# Patient Record
Sex: FEMALE | Race: WHITE | NOT HISPANIC OR LATINO | Employment: OTHER | URBAN - METROPOLITAN AREA
[De-identification: names, ages, dates, MRNs, and addresses within clinical notes are randomized per-mention and may not be internally consistent; named-entity substitution may affect disease eponyms.]

---

## 2021-09-20 ENCOUNTER — TRANSCRIBE ORDERS (OUTPATIENT)
Dept: SPEECH THERAPY | Facility: HOSPITAL | Age: 66
End: 2021-09-20

## 2021-09-20 DIAGNOSIS — R41.89 COGNITIVE DEFICITS: Primary | ICD-10-CM

## 2021-09-22 ENCOUNTER — HOSPITAL ENCOUNTER (OUTPATIENT)
Dept: SPEECH THERAPY | Facility: HOSPITAL | Age: 66
Setting detail: THERAPIES SERIES
Discharge: HOME | End: 2021-09-22

## 2021-09-22 DIAGNOSIS — R41.841 COGNITIVE COMMUNICATION DEFICIT: Primary | ICD-10-CM

## 2021-09-22 DIAGNOSIS — F02.80 ALZHEIMER DISEASE (HCC): ICD-10-CM

## 2021-09-22 DIAGNOSIS — G30.9 ALZHEIMER DISEASE (HCC): ICD-10-CM

## 2021-09-22 PROCEDURE — 96125 COGNITIVE TEST BY HC PRO: CPT | Performed by: SPEECH-LANGUAGE PATHOLOGIST

## 2021-09-22 NOTE — THERAPY EVALUATION
Outpatient Speech Language Pathology   Adult Speech Language Cognitive Initial Evaluation  [facility]     Patient Name: Tashia Felton  : 1955  MRN: [id number]  Today's Date: 2021        Visit Date: 2021   There is no problem list on file for this patient.       History reviewed. No pertinent past medical history.     History reviewed. No pertinent surgical history.      Visit Dx:    ICD-10-CM ICD-9-CM   1. Cognitive communication deficit  R41.841 799.52   2. Alzheimer disease (HCC)  G30.9 331.0    F02.80         Patient was wearing a face mask during this therapy encounter. Therapist used appropriate personal protective equipment including mask, eye protection and gloves.  Mask used was standard procedure mask. Appropriate PPE was worn during the entire therapy session. Hand hygiene was completed before and after therapy session. Patient is not in enhanced droplet precautions.               OP SLP Assessment/Plan - 21 1429        SLP Assessment    Functional Problems  Speech Language- Adult/Cognition   -KA    Impact on Function: Adult Speech Language/Cognition  Trouble learning or remembering new information;Restrictions in personal and social life   -KA    Please refer to paper survey for additional self-reported information  Yes   -KA    Please refer to items scanned into chart for additional diagnostic informaiton and handouts as provided by clinician  Yes   -KA    Prognosis  Good (comment)   -KA    Patient/caregiver participated in establishment of treatment plan and goals  Yes   -KA    Patient would benefit from skilled therapy intervention  Yes   -KA       SLP Plan    Frequency  1x a week   -KA    Duration  8 weeks   -KA    Planned CPT's?  SLP DEV COG SKILLS INITIAL (15 MIN) : 46742;SLP DEV COG SKILLS ADD (15 MIN) : 32941   -KA    Expected Duration of Therapy Session (SLP Eval)  45   -KA      User Key  (r) = Recorded By, (t) = Taken By, (c) = Cosigned By    Initials Name Provider  Type    KA Byron Ho MA,CCC-SLP Speech and Language Pathologist          SLP SLC Evaluation - 09/22/21 1200        Communication Assessment/Intervention    Document Type  evaluation   -KA    Subjective Information  no complaints   -KA    Patient Observations  alert;cooperative   -KA    Care Plan Review  evaluation/treatment results reviewed   -KA    Patient Effort  good   -KA    Symptoms Noted During/After Treatment  none   -KA       General Information    Patient Profile Reviewed  yes   -KA    Pertinent History Of Current Problem  Patient referred today for cognitive impairment. Medical records requested from referring provider GASPER Cortes. Per records patient was diagnosed with Alzheimers disease in 2017. Patient reports difficulty with short term memory. Patient reports she is not well organized, and losses lists and has trouble keeping up with appoinments. Patient reports she does drive and continues to manage ADLs. Patient lives with her ex  who does provide support. Patient reported she recently retired this year as  at [employer] and has degree in accounting. Per medical records and patient she was diagnosed with early onset Alzheimers disease in 2017 at [facility] by Dr Garcia. Patient's goals are to implement strategies and cognitive stimulating tasks.      -KA    Precautions/Limitations, Vision  WFL   -KA    Precautions/Limitations, Hearing  WFL   -KA    Patient Level of Education  Degree in accounting    -KA    Prior Level of Function-Communication  WFL;other (see comments)    prior to cognitive decline in 2017  -KA    Plans/Goals Discussed with  patient   -KA    Barriers to Rehab  none identified   -KA    Patient's Goals for Discharge  functional cognition;other (see comments)    use of strategies   -KA    Standardized Assessment Used  CLQT   -KA       Standardized Tests    Cognitive/Memory Tests  CLQT: Cognitive Linguistic Quick Test   -       CLQT (The Cognitive  "Linguistic Quick Test)    Attention Domain Score  198   -KA    Attention Severity Rating  4: WNL   -KA    Memory Domain Score  171   -KA    Memory Severity Rating  4: WNL   -KA    Executive Function Domain Score  34   -KA    Executive Function Severity Rating  4: WNL   -KA    Language Domain Score  33   -KA    Language Severity Rating  4: WNL   -KA    Visuospatial Domain Score  99   -KA    Visuospatial Severity Rating  4: WNL   -KA    Clock Drawing Total Score  12   -KA    Clock Drawing Severity Rating  WNL   -KA    Composite Severity Rating  4.0   -KA    Composite Severity Rating Range  4.0 - 3.5: WNL   -KA    CLQT Comments  Overall patient demonstrated WNL in each domain and did not score below the criterion cut of scores in any subtest. Due to patient's dx of Alzheimers disease therapy will focus on cognitive stimulating tasks and education on internal/external memory aids. Patient reported today was a \"good day,\" and reported low stress today. Patient reports she priortizes exercises, healthy diet and adequate sleep which she reports helps her. Ongoing diagnostic assessment will be completed on patient's cognitive stimulating tasks.   -KA       SLP Clinical Impressions    SLP Diagnosis  Mild cognitive impairment, Alzheimers disease    -KA    Rehab Potential/Prognosis  excellent   -    SLC Criteria for Skilled Therapy Interventions Met  yes   -KA    Functional Impact  difficulty completing home management task;difficulty completing vocational tasks   -KA       Recommendations    Therapy Frequency (SLP SLC)  1 day per week   -KA    Predicted Duration Therapy Intervention (Days)  until discharge   -KA    Anticipated Discharge Disposition (SLP)  home with assist   -KA      User Key  (r) = Recorded By, (t) = Taken By, (c) = Cosigned By    Initials Name Provider Type    Byron Gomez MA,CCC-SLP Speech and Language Pathologist                         OP SLP Education     Row Name 09/22/21 6660       Education    " Barriers to Learning  No barriers identified  -KA    Education Provided  Described results of evaluation;Patient expressed understanding of evaluation  -KA    Assessed  Learning needs;Learning motivation  -KA    Learning Motivation  Strong  -KA    Learning Method  Explanation  -KA    Teaching Response  Verbalized understanding  -KA      User Key  (r) = Recorded By, (t) = Taken By, (c) = Cosigned By    Initials Name Effective Dates    KA Byron Ho MA,[facility]-SLP 06/16/21 -         SLP OP Goals     Row Name 09/22/21 1400          Goal Type Needed    Goal Type Needed  Memory  -KA        Subjective Pain    Able to rate subjective pain?  yes  -KA     Pre-Treatment Pain Level  0  -KA     Post-Treatment Pain Level  0  -KA        Memory Goals    Memory LTG's  Patient will be able to remember  information needed to participate in activities of daily living;Patient and family will implement compensatory strategies to maximize patient’s Memory function so patient can continue to participate in daily activities  -KA     Patient and family will implement compensatory strategies to maximize patient’s Memory function so patient can continue to participate in daily activities  90%:;without cues  -KA     Status: Patient and family will implement compensatory strategies to maximize patient’s Memory function so patient can continue to participate in daily activities  New  -KA     Status: Patient will be able to remember  information needed to participate in activities of daily living  New  -KA     Memory STG's  Patient’s memory skills will be enhanced as reported by patient by utilizing internal memory strategies to recall up to 3 pieces of information after a 5- minute delay;Patient’s memory skills will be enhanced as reported by patient by using external memory aides  -KA     Patient’s memory skills will be enhanced as reported by patient by utilizing internal memory strategies to recall up to 3 pieces of information after a 5-  minute delay  90%:;without cues  -KA     Status: Patient’s memory skills will be enhanced as reported by patient by utilizing internal memory strategies to recall up to 3 pieces of information after a 5- minute delay  New  -KA     Patient’s memory skills will be enhanced as reported by patient by using external memory aides  90%:;without cues  -KA     Status: Patient’s memory skills will be enhanced as reported by patient by using external memory aides  New  -KA       User Key  (r) = Recorded By, (t) = Taken By, (c) = Cosigned By    Initials Name Provider Type    Byron Gomez MA,CCC-SLP Speech and Language Pathologist           SLP Outcome Measures (last 72 hours)      SLP Outcome Measures     Row Name 21 1400             SLP Outcome Measures    Outcome Measure Used?  Adult NOMS  -KA         Adult FCM Scores    FCM Chosen  Memory  -KA      Memory FCM Score  6  -KA        User Key  (r) = Recorded By, (t) = Taken By, (c) = Cosigned By    Initials Name Effective Dates    Byron Gomez MA,CCC-SLP 21 -         12:00 to 12:30, 8:15 to 8:30, 14:00 to 14:30     Time Calculation:   SLP Start Time: 1015  SLP Stop Time: 1100  SLP Time Calculation (min): 45 min  Timed Charges  96125-Standardized cognitive performance testin  Total Minutes  Timed Charges Total Minutes: 120   Total Minutes: 120    Therapy Charges for Today     Code Description Service Date Service Provider Modifiers Qty    03530831200  ST STD COG PERF TEST PER HOUR 2021 Byron Ho MA,CCC-SLP GN 2                   Byron oH MA,ARA-SLP  2021

## 2021-09-29 ENCOUNTER — HOSPITAL ENCOUNTER (OUTPATIENT)
Dept: SPEECH THERAPY | Facility: HOSPITAL | Age: 66
Setting detail: THERAPIES SERIES
Discharge: HOME | End: 2021-09-29

## 2021-09-29 DIAGNOSIS — R41.841 COGNITIVE COMMUNICATION DEFICIT: Primary | ICD-10-CM

## 2021-09-29 DIAGNOSIS — G30.9 ALZHEIMER DISEASE (HCC): ICD-10-CM

## 2021-09-29 DIAGNOSIS — F02.80 ALZHEIMER DISEASE (HCC): ICD-10-CM

## 2021-09-29 PROCEDURE — 97129 THER IVNTJ 1ST 15 MIN: CPT | Performed by: SPEECH-LANGUAGE PATHOLOGIST

## 2021-09-29 PROCEDURE — 97130 THER IVNTJ EA ADDL 15 MIN: CPT | Performed by: SPEECH-LANGUAGE PATHOLOGIST

## 2021-09-29 NOTE — THERAPY TREATMENT NOTE
Outpatient Speech Language Pathology   Adult Speech Language Cognitive Treatment Note  [facility]     Patient Name: Tashia Felton  : 1955  MRN: [id number]  Today's Date: 2021         Visit Date: 2021   There is no problem list on file for this patient.         Visit Dx:    ICD-10-CM ICD-9-CM   1. Cognitive communication deficit  R41.841 799.52   2. Alzheimer disease (HCC)  G30.9 331.0    F02.80          Patient was wearing a face mask during this therapy encounter. Therapist used appropriate personal protective equipment including mask, eye protection and gloves.  Mask used was standard procedure mask. Appropriate PPE was worn during the entire therapy session. Hand hygiene was completed before and after therapy session. Patient is not in enhanced droplet precautions.                               SLP OP Goals     Row Name 21 1100          Subjective Comments    Subjective Comments  Patient is pleasant and cooperative, very receptive to education.   -KA        Subjective Pain    Able to rate subjective pain?  yes  -KA     Pre-Treatment Pain Level  0  -KA     Post-Treatment Pain Level  0  -KA        Memory Goals    Patient’s memory skills will be enhanced as reported by patient by utilizing internal memory strategies to recall up to 3 pieces of information after a 5- minute delay  90%:;without cues  -KA     Status: Patient’s memory skills will be enhanced as reported by patient by utilizing internal memory strategies to recall up to 3 pieces of information after a 5- minute delay  New  -KA     Comments: Patient’s memory skills will be enhanced as reported by patient by utilizing internal memory strategies to recall up to 3 pieces of information after a 5- minute delay  5 min delay with recalling 3 unrelated words with use of internal memory aids education on (pt used repetition and visualization, other strategies included story telling and associations)=100% accuracy   -KA     Patient’s  memory skills will be enhanced as reported by patient by using external memory aides  90%:;without cues  -KA     Status: Patient’s memory skills will be enhanced as reported by patient by using external memory aides  New  -KA     Comments: Patient’s memory skills will be enhanced as reported by patient by using external memory aides  Extensive education completed today on external memory aids and how patient can apply includng use of notebooks, calendars, writing lists, dry eraser board. Patient homework is to bring a notebook next week and purchase a calendar to hang in home. patient writes down appointments in her small calendar she has in purse, however pt reports she forgets to look at it or misplaces it. Patient felt hanging a larger calendar in her kitchen would be very helpful. SLP also asked patients current partner who she lives with to attend at least one session for education to offer pt support and help pt utilize at home and pt voiced she would ask.   -KA       User Key  (r) = Recorded By, (t) = Taken By, (c) = Cosigned By    Initials Name Provider Type    Byron Gomez MA,CCC-SLP Speech and Language Pathologist                 Time Calculation:   SLP Start Time: 1015  SLP Stop Time: 1100  SLP Time Calculation (min): 45 min  Timed Charges  28477-CH Dev of Cogn Skills Initial Minutes: 15  26998-OC Dev of Cogn Skills Add Minutes: 30  Total Minutes  Timed Charges Total Minutes: 45   Total Minutes: 45    Therapy Charges for Today     Code Description Service Date Service Provider Modifiers Qty    40045694892 HC ST DEV OF COGN SKILLS INITIAL 15 MIN 9/29/2021 Byron Ho MA,CCC-SLP  1    41775482302 HC ST DEV OF COGN SKILLS EACH ADDT'L 15 MIN 9/29/2021 Byron Ho MA,CCC-SLP  2                   Byron Ho MA,CCC-SLP  9/29/2021

## 2021-10-06 ENCOUNTER — HOSPITAL ENCOUNTER (OUTPATIENT)
Dept: SPEECH THERAPY | Facility: HOSPITAL | Age: 66
Setting detail: THERAPIES SERIES
Discharge: HOME | End: 2021-10-06

## 2021-10-06 DIAGNOSIS — G30.9 ALZHEIMER DISEASE (HCC): Primary | ICD-10-CM

## 2021-10-06 DIAGNOSIS — F02.80 ALZHEIMER DISEASE (HCC): Primary | ICD-10-CM

## 2021-10-06 PROCEDURE — 97130 THER IVNTJ EA ADDL 15 MIN: CPT

## 2021-10-06 PROCEDURE — 97129 THER IVNTJ 1ST 15 MIN: CPT

## 2021-10-06 NOTE — THERAPY TREATMENT NOTE
Outpatient Speech Language Pathology   Adult Speech Language Cognitive Treatment Note  [facility]     Patient Name: Tashia Felton  : 1955  MRN: [id number]  Today's Date: 10/6/2021         Visit Date: 10/06/2021   There is no problem list on file for this patient.         Visit Dx:    ICD-10-CM ICD-9-CM   1. Alzheimer disease (HCC)  G30.9 331.0    F02.80        OP SLP Assessment/Plan - 10/06/21 1441        SLP Assessment    Functional Problems  Speech Language- Adult/Cognition   -TH      User Key  (r) = Recorded By, (t) = Taken By, (c) = Cosigned By    Initials Name Provider Type     Vivien Moscoso Speech and Language Pathologist                            SLP OP Goals     Row Name 10/06/21 1400          Memory Goals    Memory LTG's  Patient will be able to remember  information needed to participate in activities of daily living;Patient and family will implement compensatory strategies to maximize patient’s Memory function so patient can continue to participate in daily activities  -TH     Memory STG's  Patient’s memory skills will be enhanced as reported by patient by utilizing internal memory strategies to recall up to 3 pieces of information after a 5- minute delay;Patient’s memory skills will be enhanced as reported by patient by using external memory aides  -TH     Comments: Patient’s memory skills will be enhanced as reported by patient by using external memory aides  Provided education on memory strategies (internal and external) to help patient with memory for daily tasks. Provided patient with handout and reviewed examples. Patient taking notes throughout session. Also reviewed how to use notes/reminders/calendar section on iphone. She verbalizes understanding. Given 5 minute delay patient was able to recall 4/4 things about novel SLP. Began discussing cognitive apps for patients iPad, but unable to complete 2/2 time constraints.    -TH       User Key  (r) = Recorded By, (t) = Taken By, (c) =  Cosigned By    Initials Name Provider Type    TH Vivien Moscoso Speech and Language Pathologist        OP SLP Education     Row Name 10/06/21 1441       Education    Barriers to Learning  No barriers identified  -TH    Education Provided  -- memory strategies  -TH    Assessed  Learning motivation  -TH    Learning Motivation  Strong  -TH    Learning Method  Written materials  -TH    Teaching Response  Verbalized understanding  -TH    Education Comments  memory strategies and how to implement them at home  -TH      User Key  (r) = Recorded By, (t) = Taken By, (c) = Cosigned By    Initials Name Effective Dates    TH Vivien Moscoso 06/16/21 -                Time Calculation:   SLP Start Time: 1300  SLP Stop Time: 1345  SLP Time Calculation (min): 45 min    Therapy Charges for Today     Code Description Service Date Service Provider Modifiers Qty    31731995605 HC ST DEV OF COGN SKILLS INITIAL 15 MIN 10/6/2021 Vivien Moscoso  1    40342516078 HC ST DEV OF COGN SKILLS EACH ADDT'L 15 MIN 10/6/2021 Vivien Moscoso  2                   Vivien Moscoso  10/6/2021

## 2021-10-13 ENCOUNTER — HOSPITAL ENCOUNTER (OUTPATIENT)
Dept: SPEECH THERAPY | Facility: HOSPITAL | Age: 66
Setting detail: THERAPIES SERIES
Discharge: HOME | End: 2021-10-13

## 2021-10-13 DIAGNOSIS — F02.80 ALZHEIMER DISEASE (HCC): Primary | ICD-10-CM

## 2021-10-13 DIAGNOSIS — G30.9 ALZHEIMER DISEASE (HCC): Primary | ICD-10-CM

## 2021-10-13 DIAGNOSIS — R41.841 COGNITIVE COMMUNICATION DEFICIT: ICD-10-CM

## 2021-10-13 PROCEDURE — 97129 THER IVNTJ 1ST 15 MIN: CPT | Performed by: SPEECH-LANGUAGE PATHOLOGIST

## 2021-10-13 PROCEDURE — 97130 THER IVNTJ EA ADDL 15 MIN: CPT | Performed by: SPEECH-LANGUAGE PATHOLOGIST

## 2021-10-13 NOTE — THERAPY TREATMENT NOTE
Outpatient Speech Language Pathology   Adult Speech Language Cognitive Treatment Note  [facility]     Patient Name: Tashia Felton  : 1955  MRN: [id number]  Today's Date: 10/13/2021         Visit Date: 10/13/2021   There is no problem list on file for this patient.         Visit Dx:    ICD-10-CM ICD-9-CM   1. Alzheimer disease (HCC)  G30.9 331.0    F02.80    2. Cognitive communication deficit  R41.841 799.52            Patient was wearing a face mask during this therapy encounter. Therapist used appropriate personal protective equipment including mask, eye protection and gloves.  Mask used was standard procedure mask. Appropriate PPE was worn during the entire therapy session. Hand hygiene was completed before and after therapy session. Patient is not in enhanced droplet precautions.                              SLP OP Goals     Row Name 10/13/21 1100          Subjective Comments    Subjective Comments Patient is pleasant and cooperative  -KA            Subjective Pain    Able to rate subjective pain? yes  -KA     Pre-Treatment Pain Level 0  -KA     Post-Treatment Pain Level 0  -KA            Memory Goals    Patient’s memory skills will be enhanced as reported by patient by utilizing internal memory strategies to recall up to 3 pieces of information after a 5- minute delay 90%:; without cues  -KA     Status: Patient’s memory skills will be enhanced as reported by patient by utilizing internal memory strategies to recall up to 3 pieces of information after a 5- minute delay Progressing as expected  -KA     Comments: Patient’s memory skills will be enhanced as reported by patient by utilizing internal memory strategies to recall up to 3 pieces of information after a 5- minute delay Patient had been reading an article in the waiting area and wrote down 4 specific from article on her notebook she brought today. patient recalled four items written down 100% accuracy after 15 and 30 minutedelays (also use of  repetition/visualization)  -KA     Patient’s memory skills will be enhanced as reported by patient by using external memory aides 90%:; without cues  -KA     Status: Patient’s memory skills will be enhanced as reported by patient by using external memory aides Progressing as expected  -ADILIA     Comments: Patient’s memory skills will be enhanced as reported by patient by using external memory aides Ongoing education on memory strategies, pt voiced she intends to get a larger calendar to write down appointments. She reports she also intends to get a journal to write notes and things to do. She did bring notepad today and reports she has been keeping up with bringing with her and keeping in purse and writing important information down as needed. Patient reports she has all information and previously notes taken from sessions with her at home and can refer to. Patient did wrote down additional notes from today. In functional memory task today SLP read news article outloud and patient wrote down notes including facts 100% accuracy and was able to effectively recall detail. SLP also educated patient on cognitive stimulating and memory tasks including apps and handout provided today.  -KA           User Key  (r) = Recorded By, (t) = Taken By, (c) = Cosigned By    Initials Name Provider Type    Byron Gomez MA,CCC-SLP Speech and Language Pathologist                       Time Calculation:   SLP Start Time: 1015  SLP Stop Time: 1100  SLP Time Calculation (min): 45 min  Timed Charges  30217-GF Dev of Cogn Skills Initial Minutes: 15  88859-UC Dev of Cogn Skills Add Minutes: 30  Total Minutes  Timed Charges Total Minutes: 45   Total Minutes: 45    Therapy Charges for Today     Code Description Service Date Service Provider Modifiers Qty    39124893775 HC ST DEV OF COGN SKILLS INITIAL 15 MIN 10/13/2021 Byron Ho MA,CCC-SLP  1    78024969103 HC ST DEV OF COGN SKILLS EACH ADDT'L 15 MIN 10/13/2021 Byron Ho  MA,CCC-SLP  2                   Byron Ho MA,CCC-SLP  10/13/2021

## 2021-10-20 ENCOUNTER — APPOINTMENT (OUTPATIENT)
Dept: SPEECH THERAPY | Facility: HOSPITAL | Age: 66
End: 2021-10-20

## 2021-10-21 ENCOUNTER — HOSPITAL ENCOUNTER (OUTPATIENT)
Dept: SPEECH THERAPY | Facility: HOSPITAL | Age: 66
Setting detail: THERAPIES SERIES
Discharge: HOME | End: 2021-10-21

## 2021-10-21 DIAGNOSIS — R41.841 COGNITIVE COMMUNICATION DEFICIT: ICD-10-CM

## 2021-10-21 DIAGNOSIS — F02.80 ALZHEIMER DISEASE (HCC): Primary | ICD-10-CM

## 2021-10-21 DIAGNOSIS — G30.9 ALZHEIMER DISEASE (HCC): Primary | ICD-10-CM

## 2021-10-21 PROCEDURE — 97129 THER IVNTJ 1ST 15 MIN: CPT | Performed by: SPEECH-LANGUAGE PATHOLOGIST

## 2021-10-21 PROCEDURE — 97130 THER IVNTJ EA ADDL 15 MIN: CPT | Performed by: SPEECH-LANGUAGE PATHOLOGIST

## 2021-10-21 NOTE — THERAPY TREATMENT NOTE
Outpatient Speech Language Pathology   Adult Speech Language Cognitive Treatment Note  [facility]     Patient Name: Tashia Felton  : 1955  MRN: [id number]  Today's Date: 10/21/2021         Visit Date: 10/21/2021   There is no problem list on file for this patient.         Visit Dx:    ICD-10-CM ICD-9-CM   1. Alzheimer disease (HCC)  G30.9 331.0    F02.80    2. Cognitive communication deficit  R41.841 799.52            Patient was wearing a face mask during this therapy encounter. Therapist used appropriate personal protective equipment including mask, eye protection and gloves.  Mask used was standard procedure mask. Appropriate PPE was worn during the entire therapy session. Hand hygiene was completed before and after therapy session. Patient is not in enhanced droplet precautions.                              SLP OP Goals     Row Name 10/21/21 1600          Subjective Comments    Subjective Comments Patient is pleasant and cooperative, pt's partner present today for education.  -KA            Subjective Pain    Able to rate subjective pain? yes  -KA     Pre-Treatment Pain Level 0  -KA            Memory Goals    Patient’s memory skills will be enhanced as reported by patient by using external memory aides 90%:; without cues  -KA     Status: Patient’s memory skills will be enhanced as reported by patient by using external memory aides Progressing as expected  -KA     Comments: Patient’s memory skills will be enhanced as reported by patient by using external memory aides Education on external memory aids (education that has been ongoing with patient past several weeks) completed today with patient's partner. Discussed importance of partner providing support, and when he sees patient having difficulty with recalling information importance for partner to refer to list of education provided today and help patient implement strategies as needed. Discussion included examples on how to utilize strategies in  certain scenarios, (partner provided examples), pt and partner voiced understanding of education completed today.  -ADILIA           User Key  (r) = Recorded By, (t) = Taken By, (c) = Cosigned By    Initials Name Provider Type    Byron Gomez MA,CCC-SLP Speech and Language Pathologist                       Time Calculation:   SLP Start Time: 1438  SLP Stop Time: 1516  SLP Time Calculation (min): 38 min  Timed Charges  73167-JF Dev of Cogn Skills Initial Minutes: 15  83072-UG Dev of Cogn Skills Add Minutes: 23  Total Minutes  Timed Charges Total Minutes: 38   Total Minutes: 38    Therapy Charges for Today     Code Description Service Date Service Provider Modifiers Qty    59519047216 HC ST DEV OF COGN SKILLS INITIAL 15 MIN 10/21/2021 Byron Ho MA,CCC-SLP  1    47729757353 HC ST DEV OF COGN SKILLS EACH ADDT'L 15 MIN 10/21/2021 Byron Ho MA,CCC-SLP  2                   Byron Ho MA,CCC-SLP  10/21/2021

## 2021-10-27 ENCOUNTER — HOSPITAL ENCOUNTER (OUTPATIENT)
Dept: SPEECH THERAPY | Facility: HOSPITAL | Age: 66
Setting detail: THERAPIES SERIES
Discharge: HOME | End: 2021-10-27

## 2021-10-27 DIAGNOSIS — G30.9 ALZHEIMER DISEASE (HCC): Primary | ICD-10-CM

## 2021-10-27 DIAGNOSIS — R41.841 COGNITIVE COMMUNICATION DEFICIT: ICD-10-CM

## 2021-10-27 DIAGNOSIS — F02.80 ALZHEIMER DISEASE (HCC): Primary | ICD-10-CM

## 2021-10-27 PROCEDURE — 97129 THER IVNTJ 1ST 15 MIN: CPT | Performed by: SPEECH-LANGUAGE PATHOLOGIST

## 2021-10-27 PROCEDURE — 97130 THER IVNTJ EA ADDL 15 MIN: CPT | Performed by: SPEECH-LANGUAGE PATHOLOGIST

## 2021-10-27 NOTE — THERAPY PROGRESS REPORT/RE-CERT
Outpatient Speech Language Pathology   Adult Speech Language Cognitive Progress Note  [facility]     Patient Name: Tashia Felton  : 1955  MRN: [id number]  Today's Date: 10/27/2021         Visit Date: 10/27/2021   There is no problem list on file for this patient.         Visit Dx:    ICD-10-CM ICD-9-CM   1. Alzheimer disease (HCC)  G30.9 331.0    F02.80    2. Cognitive communication deficit  R41.841 799.52        OP SLP Assessment/Plan - 10/27/21 1148        SLP Assessment    Functional Problems Speech Language- Adult/Cognition  -KA    Impact on Function: Adult Speech Language/Cognition Trouble learning or remembering new information; Difficulty participating in avocational activities; Restrictions in personal and social life  -KA    Clinical Impression: Speech Language-Adult/Congnition Mild:; Cognitive Communication Impairment  -KA    Prognosis Good (comment)  -KA    Patient/caregiver participated in establishment of treatment plan and goals Yes  -KA    Patient would benefit from skilled therapy intervention Yes  -KA       SLP Plan    Frequency 1x a week  -KA    Duration 4 to 6 weeks  -KA    Planned CPT's? SLP DEV COG SKILLS INITIAL (15 MIN) : 80440; SLP DEV COG SKILLS ADD (15 MIN) : 50045  -KA    Expected Duration of Therapy Session (SLP Eval) 45  -KA          User Key  (r) = Recorded By, (t) = Taken By, (c) = Cosigned By    Initials Name Provider Type    Byron Gomez MA,CCC-SLP Speech and Language Pathologist                                   SLP OP Goals     Row Name 10/27/21 1100          Subjective Comments    Subjective Comments Patient is pleasant and cooperative  -KA            Subjective Pain    Able to rate subjective pain? yes  -KA     Pre-Treatment Pain Level 0  -KA     Post-Treatment Pain Level 0  -KA            Memory Goals    Patient and family will implement compensatory strategies to maximize patient’s Memory function so patient can continue to participate in daily activities  90%:; without cues  -KA     Status: Patient and family will implement compensatory strategies to maximize patient’s Memory function so patient can continue to participate in daily activities Progressing as expected  -KA     Comments: Patient and family will implement compensatory strategies to maximize patient’s Memory function so patient can continue to participate in daily activities Patient is utilizing memory strategies (writing lists, use of calendar and notebooks) in home environment and also utilizing internal memory aids in therapy tasks (associations, repetition, visualization) average 85% accuracy  -KA     Patient’s memory skills will be enhanced as reported by patient by utilizing internal memory strategies to recall up to 3 pieces of information after a 5- minute delay 90%:; without cues  -KA     Status: Patient’s memory skills will be enhanced as reported by patient by utilizing internal memory strategies to recall up to 3 pieces of information after a 5- minute delay Progressing as expected  -KA     Comments: Patient’s memory skills will be enhanced as reported by patient by utilizing internal memory strategies to recall up to 3 pieces of information after a 5- minute delay Multiple memory tasks with use of strategies: recall of 5 names utilizing name association strategy patient recalled 5/5 100% after 20 min and 100% after additional 20 minutes. Delayed recall of 4 related words with use of repetition and visualzation after 10 minute delay 75% without cues and 100% with min cues  -KA     Patient’s memory skills will be enhanced as reported by patient by using external memory aides 90%:; without cues  -KA     Status: Patient’s memory skills will be enhanced as reported by patient by using external memory aides Progressing as expected  -KA     Comments: Patient’s memory skills will be enhanced as reported by patient by using external memory aides Ongoing education on strategies, practiced pt writing  down detailed information from story read, pt recalled and write down facts 100% adequately. Ongoing discussion on external memory aids and how laura be applied into every day, pt voiced she has started writing lists of things to do.  -ADILIA           User Key  (r) = Recorded By, (t) = Taken By, (c) = Cosigned By    Initials Name Provider Type    Byron Gomez MA,CCC-SLP Speech and Language Pathologist               OP SLP Education     Row Name 10/27/21 1148       Education    Assessed Learning needs; Learning motivation  -ADILIA    Learning Motivation Strong  -ADILIA    Learning Method Explanation  -ADILIA    Teaching Response Verbalized understanding  -ADILIA          User Key  (r) = Recorded By, (t) = Taken By, (c) = Cosigned By    Initials Name Effective Dates    Byron Gomez MA,CCC-SLP 06/16/21 -                      Time Calculation:   SLP Start Time: 1015  SLP Stop Time: 1108  SLP Time Calculation (min): 53 min  Timed Charges  93356-WX Dev of Cogn Skills Initial Minutes: 15  30293-LY Dev of Cogn Skills Add Minutes: 38  Total Minutes  Timed Charges Total Minutes: 53   Total Minutes: 53    Therapy Charges for Today     Code Description Service Date Service Provider Modifiers Qty    07431414178 HC ST DEV OF COGN SKILLS INITIAL 15 MIN 10/27/2021 Byron Ho MA,CCC-SLP  1    75201754619 HC ST DEV OF COGN SKILLS EACH ADDT'L 15 MIN 10/27/2021 Byron Ho MA,CCC-SLP  3                   Byron Ho MA,CCC-SLP  10/27/2021

## 2021-11-03 ENCOUNTER — HOSPITAL ENCOUNTER (OUTPATIENT)
Dept: SPEECH THERAPY | Facility: HOSPITAL | Age: 66
Setting detail: THERAPIES SERIES
Discharge: HOME | End: 2021-11-03

## 2021-11-03 DIAGNOSIS — F02.80 ALZHEIMER DISEASE (HCC): Primary | ICD-10-CM

## 2021-11-03 DIAGNOSIS — G30.9 ALZHEIMER DISEASE (HCC): Primary | ICD-10-CM

## 2021-11-03 DIAGNOSIS — R41.841 COGNITIVE COMMUNICATION DEFICIT: ICD-10-CM

## 2021-11-03 PROCEDURE — 97129 THER IVNTJ 1ST 15 MIN: CPT | Performed by: SPEECH-LANGUAGE PATHOLOGIST

## 2021-11-03 PROCEDURE — 97130 THER IVNTJ EA ADDL 15 MIN: CPT | Performed by: SPEECH-LANGUAGE PATHOLOGIST

## 2021-11-03 NOTE — THERAPY TREATMENT NOTE
Outpatient Speech Language Pathology   Adult Speech Language Cognitive Treatment Note  [facility]     Patient Name: Tashia Felton  : 1955  MRN: [id number]  Today's Date: 11/3/2021         Visit Date: 2021   There is no problem list on file for this patient.         Visit Dx:    ICD-10-CM ICD-9-CM   1. Alzheimer disease (HCC)  G30.9 331.0    F02.80    2. Cognitive communication deficit  R41.841 799.52                  Patient was wearing a face mask during this therapy encounter. Therapist used appropriate personal protective equipment including mask, eye protection and gloves.  Mask used was standard procedure mask. Appropriate PPE was worn during the entire therapy session. Hand hygiene was completed before and after therapy session. Patient is not in enhanced droplet precautions.                        SLP OP Goals     Row Name 21 1200          Subjective Comments    Subjective Comments Patient is pleasant and cooperative.  -KA            Subjective Pain    Able to rate subjective pain? yes  -KA     Pre-Treatment Pain Level 0  -KA     Post-Treatment Pain Level 0  -KA            Memory Goals    Patient’s memory skills will be enhanced as reported by patient by utilizing internal memory strategies to recall up to 3 pieces of information after a 5- minute delay 90%:; without cues  -KA     Status: Patient’s memory skills will be enhanced as reported by patient by utilizing internal memory strategies to recall up to 3 pieces of information after a 5- minute delay Progressing as expected  -KA     Comments: Patient’s memory skills will be enhanced as reported by patient by utilizing internal memory strategies to recall up to 3 pieces of information after a 5- minute delay recall of 8 words grouped into 2 categories after 25 minute delay=75% without cues and 100% with min cues  -KA     Patient’s memory skills will be enhanced as reported by patient by using external memory aides 90%:; without cues   -KA     Status: Patient’s memory skills will be enhanced as reported by patient by using external memory aides Progressing as expected  -KA     Comments: Patient’s memory skills will be enhanced as reported by patient by using external memory aides Patient reports to continuing to write more information down (things to do, reminders) however reports to not consistently use the same strategy daily. She reports goal to write in a notebook daily (has one) and goal is to write down daily events etc however reports to not having started yet. In session SLP read two different news articles (different complexity and detail) and patient wrote effective notes in order to recall and summarize story with 100% adequately, pt also able to recall initial story with 80% accuracy.  -KA           User Key  (r) = Recorded By, (t) = Taken By, (c) = Cosigned By    Initials Name Provider Type    Byron Gomez MA,CCC-SLP Speech and Language Pathologist                       Time Calculation:   SLP Start Time: 1015  SLP Stop Time: 1100  SLP Time Calculation (min): 45 min  Timed Charges  74765-YS Dev of Cogn Skills Initial Minutes: 15  55045-AY Dev of Cogn Skills Add Minutes: 30  Total Minutes  Timed Charges Total Minutes: 45   Total Minutes: 45    Therapy Charges for Today     Code Description Service Date Service Provider Modifiers Qty    29071222483 HC ST DEV OF COGN SKILLS INITIAL 15 MIN 11/3/2021 Byron Ho MA,CCC-SLP  1    84725932739 HC ST DEV OF COGN SKILLS EACH ADDT'L 15 MIN 11/3/2021 Byron Ho MA,CCC-SLP  2                   Byron Ho MA,CCC-SLP  11/3/2021

## 2021-11-10 ENCOUNTER — APPOINTMENT (OUTPATIENT)
Dept: SPEECH THERAPY | Facility: HOSPITAL | Age: 66
End: 2021-11-10

## 2021-11-17 ENCOUNTER — HOSPITAL ENCOUNTER (OUTPATIENT)
Dept: SPEECH THERAPY | Facility: HOSPITAL | Age: 66
Setting detail: THERAPIES SERIES
Discharge: HOME | End: 2021-11-17

## 2021-11-17 DIAGNOSIS — R41.841 COGNITIVE COMMUNICATION DEFICIT: ICD-10-CM

## 2021-11-17 DIAGNOSIS — G30.9 ALZHEIMER DISEASE (HCC): Primary | ICD-10-CM

## 2021-11-17 DIAGNOSIS — F02.80 ALZHEIMER DISEASE (HCC): Primary | ICD-10-CM

## 2021-11-17 PROCEDURE — 97129 THER IVNTJ 1ST 15 MIN: CPT | Performed by: SPEECH-LANGUAGE PATHOLOGIST

## 2021-11-17 PROCEDURE — 97130 THER IVNTJ EA ADDL 15 MIN: CPT | Performed by: SPEECH-LANGUAGE PATHOLOGIST

## 2021-11-17 NOTE — THERAPY TREATMENT NOTE
Outpatient Speech Language Pathology   Adult Speech Language Cognitive Treatment Note  [facility]     Patient Name: Tashia Felton  : 1955  MRN: [id number]  Today's Date: 2021         Visit Date: 2021   There is no problem list on file for this patient.         Visit Dx:    ICD-10-CM ICD-9-CM   1. Alzheimer disease (HCC)  G30.9 331.0    F02.80    2. Cognitive communication deficit  R41.841 799.52            Patient was wearing a face mask during this therapy encounter. Therapist used appropriate personal protective equipment including mask, eye protection and gloves.  Mask used was standard procedure mask. Appropriate PPE was worn during the entire therapy session. Hand hygiene was completed before and after therapy session. Patient is not in enhanced droplet precautions.                              SLP OP Goals     Row Name 21 1200          Goal Type Needed    Goal Type Needed Verbal Expression  -KA            Subjective Comments    Subjective Comments Patient is pleasant and cooperative  -KA            Subjective Pain    Able to rate subjective pain? yes  -KA     Pre-Treatment Pain Level 0  -KA     Post-Treatment Pain Level 0  -KA            Verbal Expression Goals    Verbal Expression LTG's Patient will be able to use verbal expressive language skills to communicate effectively in all situations with  familiar listener  -KA     Patient will be able to use verbal expressive language skills to communicate effectively in all situations with  familiar listener 90%:; without cues  -KA     Status: Patient will be able to use verbal expressive language skills to communicate effectively in all situations with  familiar listener New  -KA     Comments: Patient will be able to use verbal expressive language skills to communicate effectively in all situations with  familiar listener Patient voiced frustration today with increase in word finding difficulty and provided education on word finding  strategies and tasks.  -KA     Verbal Expression STG's Patient will improve verbal expressive language skills by completing divergent naming tasks; Patient will improve verbal expressive language skills by describing attributes, function, action and/or uses of an object/item; Patient will improve verbal expressive language skills by providing simple/complex target word when given its definition, meaning, attributes, function, or use  -KA     Patient will improve verbal expressive language skills by completing divergent naming tasks 90%:; without cues  -KA     Status: Patient will improve verbal expressive language skills by completing divergent naming tasks New  -KA     Comments: Patient will improve verbal expressive language skills by completing divergent naming tasks Average of 10 within 60 seconds in concrete category and 8 in abstract, task provided for homework  -KA     Patient will improve verbal expressive language skills by describing attributes, function, action and/or uses of an object/item 90%:; without cues  -KA     Status: Patient will improve verbal expressive language skills by describing attributes, function, action and/or uses of an object/item New  -KA     Comments: Patient will improve verbal expressive language skills by describing attributes, function, action and/or uses of an object/item 80% without cues with describing semantic features, task targeted as use of strategy for word finding difficulty.  -KA     Patient will improve verbal expressive language skills by providing simple/complex target word when given its definition, meaning, attributes, function, or use 90%:; without cues  -KA     Status: Patient will improve verbal expressive language skills by providing simple/complex target word when given its definition, meaning, attributes, function, or use New  -KA     Comments: Patient will improve verbal expressive language skills by providing simple/complex target word when given its  definition, meaning, attributes, function, or use 80% without cues  -KA            Memory Goals    Patient’s memory skills will be enhanced as reported by patient by using external memory aides 90%:; without cues  -KA     Status: Patient’s memory skills will be enhanced as reported by patient by using external memory aides Progressing as expected  -KA     Comments: Patient’s memory skills will be enhanced as reported by patient by using external memory aides Patient did not bring her notebook with her today however she reported she has started writing in a notebook daily what acitvites she has completed throughout the day. Ongoing discussion on writing down things to do the night before for the next day, due to patient reporting frustration getting late start to her day. Patient also reports she is doing better keeping important items in the same place, and overall reports she is writing more information down (lists, notebook, calendar) and feels she is keeping track of things better.  -KA           User Key  (r) = Recorded By, (t) = Taken By, (c) = Cosigned By    Initials Name Provider Type    Byron Gomez MA,CCC-SLP Speech and Language Pathologist                       Time Calculation:   SLP Start Time: 1015  SLP Stop Time: 1100  SLP Time Calculation (min): 45 min  Timed Charges  80319-UJ Dev of Cogn Skills Initial Minutes: 15  85442-RY Dev of Cogn Skills Add Minutes: 30  Total Minutes  Timed Charges Total Minutes: 45   Total Minutes: 45    Therapy Charges for Today     Code Description Service Date Service Provider Modifiers Qty    55378103102 HC ST DEV OF COGN SKILLS INITIAL 15 MIN 11/17/2021 Byron Ho MA,CCC-SLP  1    68401818880 HC ST DEV OF COGN SKILLS EACH ADDT'L 15 MIN 11/17/2021 Byron Ho MA,CCC-SLP  2                   Byron Ho MA,CCC-SLP  11/17/2021

## 2021-11-24 ENCOUNTER — HOSPITAL ENCOUNTER (OUTPATIENT)
Dept: SPEECH THERAPY | Facility: HOSPITAL | Age: 66
Setting detail: THERAPIES SERIES
Discharge: HOME | End: 2021-11-24

## 2021-11-24 DIAGNOSIS — G30.9 ALZHEIMER DISEASE (HCC): Primary | ICD-10-CM

## 2021-11-24 DIAGNOSIS — F02.80 ALZHEIMER DISEASE (HCC): Primary | ICD-10-CM

## 2021-11-24 DIAGNOSIS — R41.841 COGNITIVE COMMUNICATION DEFICIT: ICD-10-CM

## 2021-11-24 PROCEDURE — 97130 THER IVNTJ EA ADDL 15 MIN: CPT | Performed by: SPEECH-LANGUAGE PATHOLOGIST

## 2021-11-24 PROCEDURE — 97129 THER IVNTJ 1ST 15 MIN: CPT | Performed by: SPEECH-LANGUAGE PATHOLOGIST

## 2021-11-24 NOTE — THERAPY TREATMENT NOTE
Outpatient Speech Language Pathology   Adult Speech Language Cognitive Treatment Note  [facility]     Patient Name: Tashia Felton  : 1955  MRN: [id number]  Today's Date: 2021         Visit Date: 2021   There is no problem list on file for this patient.         Visit Dx:    ICD-10-CM ICD-9-CM   1. Alzheimer disease (HCC)  G30.9 331.0    F02.80    2. Cognitive communication deficit  R41.841 799.52                  Patient was wearing a face mask during this therapy encounter. Therapist used appropriate personal protective equipment including mask, eye protection and gloves.  Mask used was standard procedure mask. Appropriate PPE was worn during the entire therapy session. Hand hygiene was completed before and after therapy session. Patient is not in enhanced droplet precautions.                        SLP OP Goals     Row Name 21 1200          Subjective Comments    Subjective Comments Patient is pleasant and cooperative  -KA            Subjective Pain    Able to rate subjective pain? yes  -KA     Pre-Treatment Pain Level 0  -KA     Post-Treatment Pain Level 0  -KA            Verbal Expression Goals    Patient will improve verbal expressive language skills by completing divergent naming tasks 90%:; without cues  -KA     Status: Patient will improve verbal expressive language skills by completing divergent naming tasks Progressing as expected  -KA     Comments: Patient will improve verbal expressive language skills by completing divergent naming tasks 100% with abstract convergent. Average of 10 within 60 seconds in concrete category and 8 in abstract, task provided for homework  -KA            Memory Goals    Patient’s memory skills will be enhanced as reported by patient by using external memory aides 90%:; without cues  -KA     Status: Patient’s memory skills will be enhanced as reported by patient by using external memory aides Progressing as expected  -KA     Comments: Patient’s  memory skills will be enhanced as reported by patient by using external memory aides Ongoing education and discussion on how patient is implementing strategies at home. She voices she has added a new calendar on a door where she visualizes daily and keeps up with appointments. She reports she has been increasing her use of post it notes however sometimes forgets where she keeps them. She reports she writes in her notebook almost daily a summary of her days events and actiivties. Patient voiced she is pleased with her use of strategies and feels they are helpful, however also stated her goal is to become more consistent and SLP stated within time and continued use it will become more habit forming and consistent. Today patient wrote a summary from multiparagraph news article read outloud and effectively took notes and recalled and sumamrized information with 100% accuracy.  -ADILIA           User Key  (r) = Recorded By, (t) = Taken By, (c) = Cosigned By    Initials Name Provider Type    Byron Gomez MA,CCC-SLP Speech and Language Pathologist                       Time Calculation:   SLP Start Time: 1015  SLP Stop Time: 1100  SLP Time Calculation (min): 45 min  Timed Charges  77203-FV Dev of Cogn Skills Initial Minutes: 15  12352-EO Dev of Cogn Skills Add Minutes: 30  Total Minutes  Timed Charges Total Minutes: 45   Total Minutes: 45    Therapy Charges for Today     Code Description Service Date Service Provider Modifiers Qty    69511646784 HC ST DEV OF COGN SKILLS INITIAL 15 MIN 11/24/2021 Byron Ho MA,CCC-SLP  1    85369830231 HC ST DEV OF COGN SKILLS EACH ADDT'L 15 MIN 11/24/2021 Byron Ho MA,CCC-SLP  2                   Byron Ho MA,CCC-SLP  11/24/2021

## 2021-12-01 ENCOUNTER — HOSPITAL ENCOUNTER (OUTPATIENT)
Dept: SPEECH THERAPY | Facility: HOSPITAL | Age: 66
Setting detail: THERAPIES SERIES
Discharge: HOME | End: 2021-12-01

## 2021-12-01 DIAGNOSIS — F02.80 ALZHEIMER DISEASE (HCC): Primary | ICD-10-CM

## 2021-12-01 DIAGNOSIS — G30.9 ALZHEIMER DISEASE (HCC): Primary | ICD-10-CM

## 2021-12-01 DIAGNOSIS — R41.841 COGNITIVE COMMUNICATION DEFICIT: ICD-10-CM

## 2021-12-01 PROCEDURE — 97130 THER IVNTJ EA ADDL 15 MIN: CPT

## 2021-12-01 PROCEDURE — 97129 THER IVNTJ 1ST 15 MIN: CPT

## 2021-12-01 NOTE — THERAPY TREATMENT NOTE
Outpatient Speech Language Pathology   Adult Speech Language Cognitive Treatment Note  [facility]     Patient Name: Tashia Felton  : 1955  MRN: [id number]  Today's Date: 2021         Visit Date: 2021   There is no problem list on file for this patient.         Visit Dx:    ICD-10-CM ICD-9-CM   1. Alzheimer disease (HCC)  G30.9 331.0    F02.80    2. Cognitive communication deficit  R41.841 799.52        OP SLP Assessment/Plan - 21 1456        SLP Plan    Plan Comments Continue plan of care.  -AB          User Key  (r) = Recorded By, (t) = Taken By, (c) = Cosigned By    Initials Name Provider Type    oRs Godwin MS CCC-SLP Speech and Language Pathologist                                   SLP OP Goals     Row Name 21 1400          Subjective Comments    Subjective Comments Pt arrives on time and is cooperative and pleasant throughout. She appears highly motivated, takes notes throughout  -AB            Subjective Pain    Able to rate subjective pain? yes  -AB     Pre-Treatment Pain Level 0  -AB     Post-Treatment Pain Level 0  -AB            Verbal Expression Goals    Patient will improve verbal expressive language skills by completing divergent naming tasks 90%:; without cues  -AB     Status: Patient will improve verbal expressive language skills by completing divergent naming tasks Progressing as expected  -AB     Comments: Patient will improve verbal expressive language skills by completing divergent naming tasks 100% for abstract divergent within 60 seconds - average 10 (13, 8) increases to average 15 with MIN cues and additional time (60 sec)  -AB            Memory Goals    Patient’s memory skills will be enhanced as reported by patient by utilizing internal memory strategies to recall up to 3 pieces of information after a 5- minute delay 90%:; without cues  -AB     Status: Patient’s memory skills will be enhanced as reported by patient by utilizing internal memory  strategies to recall up to 3 pieces of information after a 5- minute delay Progressing as expected  -AB     Comments: Patient’s memory skills will be enhanced as reported by patient by utilizing internal memory strategies to recall up to 3 pieces of information after a 5- minute delay Utilization of x2 strategies for recall of 5 component directions. With 5 minute delay - 5/5, 100% for repetition and self rehearsal, 5/5, 100% for part word writing/mnemonic. Both tasks with 100%, pt states easier utilization of written. Pt given handouts regarding strategies per her requests and hopes to review and continue trialing as indicated.  -AB     Patient’s memory skills will be enhanced as reported by patient by using external memory aides 90%:; without cues  -AB     Status: Patient’s memory skills will be enhanced as reported by patient by using external memory aides Progressing as expected  -AB     Comments: Patient’s memory skills will be enhanced as reported by patient by using external memory aides Ongoing education and discussion on how patient is implementing strategies at home. Pt has multiple questions which are addressed appropriately. Handouts provided regarding memory strategies - pt wishes to review and trial additional strategies coinciding with her preferred learning style. Pt additionally takes notes throughout today’s session and verbalizes personal interest regarding memory and progression of Alzheimer’s. Externally, pt continues with use of calendar on wall, and notebook within purse. Pt summarizes today’s session and education with her own notes with 100% accuracy.  -AB           User Key  (r) = Recorded By, (t) = Taken By, (c) = Cosigned By    Initials Name Provider Type    Ros Godwin MS CCC-SLP Speech and Language Pathologist               OP SLP Education     Row Name 12/01/21 2905       Education    Education Comments Memory strategies handout provided as well as review for plan of care.   -AB          User Key  (r) = Recorded By, (t) = Taken By, (c) = Cosigned By    Initials Name Effective Dates    AB Ros Hernandez, MS CCC-SLP 08/01/21 -                      Time Calculation:   SLP Start Time: 1015  SLP Stop Time: 1100  SLP Time Calculation (min): 45 min  Timed Charges  31969-ID Dev of Cogn Skills Initial Minutes: 15  05110-PI Dev of Cogn Skills Add Minutes: 30  Total Minutes  Timed Charges Total Minutes: 45   Total Minutes: 45    Therapy Charges for Today     Code Description Service Date Service Provider Modifiers Qty    27580108958 HC ST DEV OF COGN SKILLS INITIAL 15 MIN 12/1/2021 Ros Hernandez, MS CCC-SLP  1    [id number] HC ST DEV OF COGN SKILLS EACH ADDT'L 15 MIN 12/1/2021 Ros Henrandez, MS CCC-SLP  2             PPE:  Gloves, standard mask, goggles      oRs Hernandez MS CCC-SLP  12/1/2021

## 2021-12-08 ENCOUNTER — HOSPITAL ENCOUNTER (OUTPATIENT)
Dept: SPEECH THERAPY | Facility: HOSPITAL | Age: 66
Setting detail: THERAPIES SERIES
Discharge: HOME | End: 2021-12-08

## 2021-12-08 DIAGNOSIS — G30.9 ALZHEIMER DISEASE (HCC): Primary | ICD-10-CM

## 2021-12-08 DIAGNOSIS — R41.841 COGNITIVE COMMUNICATION DEFICIT: ICD-10-CM

## 2021-12-08 DIAGNOSIS — F02.80 ALZHEIMER DISEASE (HCC): Primary | ICD-10-CM

## 2021-12-08 PROCEDURE — 97130 THER IVNTJ EA ADDL 15 MIN: CPT | Performed by: SPEECH-LANGUAGE PATHOLOGIST

## 2021-12-08 PROCEDURE — 97129 THER IVNTJ 1ST 15 MIN: CPT | Performed by: SPEECH-LANGUAGE PATHOLOGIST

## 2021-12-08 NOTE — THERAPY TREATMENT NOTE
Outpatient Speech Language Pathology   Adult Speech Language Cognitive Treatment Note  [facility]     Patient Name: Tashia Felton  : 1955  MRN: [id number]  Today's Date: 2021         Visit Date: 2021   There is no problem list on file for this patient.         Visit Dx:    ICD-10-CM ICD-9-CM   1. Alzheimer disease (HCC)  G30.9 331.0    F02.80    2. Cognitive communication deficit  R41.841 799.52            Patient was wearing a face mask during this therapy encounter. Therapist used appropriate personal protective equipment including mask, eye protection and gloves.  Mask used was standard procedure mask. Appropriate PPE was worn during the entire therapy session. Hand hygiene was completed before and after therapy session. Patient is not in enhanced droplet precautions.                              SLP OP Goals     Row Name 21 1200          Subjective Comments    Subjective Comments Patient reports continued improvement with use of memory aids including calendars and reports she is benefitting from therapy.  -KA            Subjective Pain    Able to rate subjective pain? yes  -KA     Pre-Treatment Pain Level 0  -KA     Post-Treatment Pain Level 0  -KA            Memory Goals    Status: Patient’s memory skills will be enhanced as reported by patient by utilizing internal memory strategies to recall up to 3 pieces of information after a 5- minute delay Progressing as expected  -KA     Comments: Patient’s memory skills will be enhanced as reported by patient by utilizing internal memory strategies to recall up to 3 pieces of information after a 5- minute delay delayed recall of 5 names with use of strategies 100% without cues after 20 minute delay and delayed recall of three functional errands after 30 minutes 100% without cues  -KA     Patient’s memory skills will be enhanced as reported by patient by using external memory aides 90%:; without cues  -KA     Status: Patient’s memory skills  will be enhanced as reported by patient by using external memory aides Progressing as expected  -ADILIA     Comments: Patient’s memory skills will be enhanced as reported by patient by using external memory aides Writing information down from story she read (visual) and story SLP read (auditory) pt effectively took notes and recalled detail with 90% accuracy. Patient reports she is consistently utilizing strategies including calendar and notebook and reports it is helping her.  -ADILIA           User Key  (r) = Recorded By, (t) = Taken By, (c) = Cosigned By    Initials Name Provider Type    Byron Gomez MA,CCC-SLP Speech and Language Pathologist                       Time Calculation:   SLP Start Time: 1015  SLP Stop Time: 1053  SLP Time Calculation (min): 38 min  Timed Charges  07916-GL Dev of Cogn Skills Initial Minutes: 15  34034-OF Dev of Cogn Skills Add Minutes: 23  Untimed Charges  SLP Eval/Re-eval : ST Eval Speech Sound Production - 98402  Total Minutes  Timed Charges Total Minutes: 38   Total Minutes: 38    Therapy Charges for Today     Code Description Service Date Service Provider Modifiers Qty    10455359060 HC ST DEV OF COGN SKILLS INITIAL 15 MIN 12/8/2021 Byron Ho MA,CCC-SLP  1    [id number] HC ST DEV OF COGN SKILLS EACH ADDT'L 15 MIN 12/8/2021 Byron Ho MA,CCC-SLP  2                   Byron Ho MA,CCC-SLP  12/8/2021

## 2021-12-15 ENCOUNTER — APPOINTMENT (OUTPATIENT)
Dept: SPEECH THERAPY | Facility: HOSPITAL | Age: 66
End: 2021-12-15

## 2021-12-22 ENCOUNTER — APPOINTMENT (OUTPATIENT)
Dept: SPEECH THERAPY | Facility: HOSPITAL | Age: 66
End: 2021-12-22

## 2021-12-29 ENCOUNTER — APPOINTMENT (OUTPATIENT)
Dept: SPEECH THERAPY | Facility: HOSPITAL | Age: 66
End: 2021-12-29

## 2022-01-05 ENCOUNTER — APPOINTMENT (OUTPATIENT)
Dept: SPEECH THERAPY | Facility: HOSPITAL | Age: 67
End: 2022-01-05

## 2022-01-12 ENCOUNTER — APPOINTMENT (OUTPATIENT)
Dept: SPEECH THERAPY | Facility: HOSPITAL | Age: 67
End: 2022-01-12

## 2022-01-19 ENCOUNTER — APPOINTMENT (OUTPATIENT)
Dept: SPEECH THERAPY | Facility: HOSPITAL | Age: 67
End: 2022-01-19

## 2022-01-26 ENCOUNTER — APPOINTMENT (OUTPATIENT)
Dept: SPEECH THERAPY | Facility: HOSPITAL | Age: 67
End: 2022-01-26

## 2022-02-02 ENCOUNTER — APPOINTMENT (OUTPATIENT)
Dept: SPEECH THERAPY | Facility: HOSPITAL | Age: 67
End: 2022-02-02

## 2022-02-09 ENCOUNTER — APPOINTMENT (OUTPATIENT)
Dept: SPEECH THERAPY | Facility: HOSPITAL | Age: 67
End: 2022-02-09

## 2022-02-23 ENCOUNTER — APPOINTMENT (OUTPATIENT)
Dept: SPEECH THERAPY | Facility: HOSPITAL | Age: 67
End: 2022-02-23